# Patient Record
Sex: FEMALE | Race: WHITE | Employment: FULL TIME | URBAN - NONMETROPOLITAN AREA
[De-identification: names, ages, dates, MRNs, and addresses within clinical notes are randomized per-mention and may not be internally consistent; named-entity substitution may affect disease eponyms.]

---

## 2022-06-15 ENCOUNTER — HOSPITAL ENCOUNTER (OUTPATIENT)
Dept: PHYSICAL THERAPY | Age: 57
Setting detail: THERAPIES SERIES
Discharge: HOME | End: 2022-06-15
Payer: COMMERCIAL

## 2022-06-15 PROCEDURE — 97750 PHYSICAL PERFORMANCE TEST: CPT

## 2022-06-15 NOTE — DISCHARGE SUMMARY
** PLEASE SIGN, DATE AND TIME CERTIFICATION BELOW AND RETURN TO [facility] OUTPATIENT REHABILITATION (FAX #: [phone]). ATTEST/CO-SIGN IF ACCESSING VIA [employer]. THANK YOU.**    I certify that I have examined the patient below and determined that Physical Medicine and Rehabilitation service is necessary and that I approve the established plan of care for up to 90 days or as specifically noted. Attestation, signature or co-signature of physician indicates approval of certification requirements.    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]    Reason for Referral Application for disability benefits   Date of Evaluation 6/15/22    Additional Pertinent History Arthritis, lumbar surgery, hernia surgery       Insurance: Primary: Payor: Connie Wallis /  /  / ,   Secondary:    Authorization Information: PRE CERTIFICATION REQUIRED: NO   INSURANCE THERAPY BENEFIT:  27 VISITS MAX PER CALENDAR YEAR,  CPT CODE 41002 IS VALID   AQUATIC THERAPY COVERED: NA  MODALITIES COVERED:  NA   Visit # 1, 1/10 for progress note   Visits Allowed: 30 visits    Recertification Date: 3/74/09   Physician Orders: FCE     Total time of physical performance tests/measurements and analysis/interpretation:  Start time: 1300, End time: 1451 , Total time: 111    SUBJECTIVE: Patient is a 61year old female who presents to outpatient therapy services secondary to impaired functinoal mobility and decreased dexterity. Patient referred to physical therapy for functional capacity examination to determine current functional status with potential for disability benefits.  Patient reports that she has had chronic back pain for years now. Patient reports that she did have significant weakness in her leg. Patient reports that she had L4-S1 laminectomy and fusion surgery April 2021. Patient then went back to work in August. Patient reports by January she could not move her R leg again due to pain and weakness. Patient does report pain radiates into her feet. Patient has not had PT treatment for her back. Patient has had several injections in her back. Social/Functional History and Current Status:  Medications and Allergies have been reviewed and are listed on Medical History Questionnaire. 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     Objective:    Pain 6/10 pain in back and LEs   Sensation Light touch intact B LEs   Vision/hearing No vision or hearing impairments        NECK RANGE OF MOTION   Flexion WFL   Extension 31   Rotation Right 58   Rotation Left 35   Sidebending Right 23   Sidebending Left 25   Neck Range of Motion is [facility]  []     TRUNK RANGE OF MOTION   Flexion 80% limited   Extension 95% limited   Rotation Right 75% limited   Rotation Left 75% limited   Sidebending Right 75% limited   Sidebending Left 75% limited   Trunk Range of Motion is [facility]  []       UPPER EXTREMITY RANGE OF MOTION    Left Right COMMENTS Shoulder Flexion      Shoulder Extension      Shoulder Abduction      Shoulder Adduction      Shoulder External Rotation      Shoulder Internal Rotation      Shoulder Range of Motion is [facility]  [x]      Elbow Flexion      Elbow Extension      Elbow Range of Motion is [facility]  [x]     LOWER EXTREMITY RANGE OF MOTION    Left Right COMMENTS   Hip Flexion 90 90    Hip Extension limited limited Unable to get into prone   Hip Abduction [facility]    Hip External Rotation [facility]    Hip Internal Rotation [facility] WFL    Hip Range of Motion is [facility]  []      Knee Flexion      Knee Extension      Ankle DF      Ankle PF      Knee and Ankle Range of Motion is [facility]  [x]       UPPER EXTREMITY STRENGTH    Left Right Comments   Shoulder Flexion 5 5    Shoulder Extension 5 5    Shoulder Abduction 5 5    Shoulder Adduction 5 5    Shoulder External Rotation 5 5    Shoulder Internal Rotation 5 5    Shoulder Horizontal ABD 5 5    Shoulder Horizontal ADD 5 5    []  Shoulder Strength is grossly WFL. Elbow Flexion 5 5    Elbow Extension 5 5    Wrist Flexion 5 5    Wrist Extension 5 5    [] Elbow and Wrist Strength is grossly WFL.         LOWER EXTREMITY STRENGTH    Left Right COMMENTS   Hip Flexion 4- 3-    Hip Extension limited limited Can only perform a very small range bridge   Hip Abduction 4 4    Hip Strength is [facility]  []      Knee Flexion 5 5    Knee Extension 5 5    Ankle DF 5 5    Ankle PF 5 5    Knee and Ankle Strength is WFL  [x]      STRENGTH (TESTED WITH DYNAMOMETER)   Dynamometer Setting Left Left Hand Norms Right Right Hand Norms   1 40#  30#    2 58#, 55#, 55#  (average: 56#) 35-59# 58#, 47#, 60#   (average: 55#) 45-70#   3 45#  45#    4 42#  40#    5 40#  35#      PINCH STRENGTH (TESTED WITH PINCH METER)    Left Left Hand Norms Right Right Hand Norms   2 Point Tip 10#, 9#, 8#  (average: 9#) 9-12# 10#, 9#, 10#  (average: 9.6#) 10-13#   3 Jaw Sam 11#, 10#, 10#  (average: 10.3#) 12-18# 12#, 11#, 11#  (average: 11.3#) 13-19# Lateral/Pinch 11#, 12#, 11#  (average: 11.3#) 12.5-17# 11#, 11#, 12#  (average: 11.3#) 13-18#     [place] RATE OF MANIPULATION    Placing (seconds) Turning (seconds)   Test 1 122 seconds 112 seconds    Test 2 97 seconds 96 seconds    Total 219 seconds  208 seconds    Percentile Rank < 1st percentile  < 1st percentile      PURDUE PEGBOARD TEST    # Completed Percentile Rank   Right Hand (pegs) 11 pegs  < 1st   Left Hand (pegs) 12 pegs 3rd percentile    Both (pairs) 10 pegs 3rd percentile    Assembly (pieces) 14 pieces  < 1st percentile      MOBILITY EVALUATION    Completed Comments   Forward Walking (10 feet) Yes Patient ambulates with no AD with very stiff and rigid gait pattern, decreased marilyn, decreased step length and height, decreased trunk rotation, forward flexed trunk   Backward Walking (10 feet) Yes Patient ambulates slowly, very rigid and guarded gait pattern   Heel/Toe Walk (10 feet) Yes    Walk on Toes (10 feet) Yes    Walk on Heels (10 feet) Yes    Balance on Right Foot (5 sec) Yes No UE support   Balance on Left Foot (5 sec) Yes No UE support     NON MATERIAL HANDLING    Completed Comments   Sitting (30 minutes) No Patient only able to sit 8 minutes before having to stand up due to burning pain in her legs and back. Weight shifts often due to pain. Standing (30 minutes) No Patient able to stand for 19 minutes due to back pain and buttocks pain. Weight shifts and moves a lot due to pain.     Walking (1/2 mile) No Patient able to ambulate 276 feet in 2 minutes and 35 seconds before having to stop due to pain   Static Trunk Bending (1 minute) No  Able to hold for 13 seconds, stopped due to back and hip pain   Overhead Reaching (1 minute) No Able to hold for 40 seconds, stopped due to back pain   Repeated squatting (5 reps) Yes Able to perform squat through partial range 5 times, decreased weight shift R   Kneeling (1 minute) Yes    Stooping (5 reps) Yes Able to perform 5 through partial range and did have to bend her knees some to bend forward more    Crouching (1 minutes) No Able to hold 20 seconds, stopped due to back pain   Ladder Climb (1 flight of steps) Yes Patient negotiates 12 steps with B HR and non-reciprocal gait pattern. MATERIAL HANDLING ACTIVITIES (POUNDS)    Occasional  1-33% Frequent  34-66% Constant   %   Floor to Knuckle 12.5# 7.5# 3   12\" to Knuckle 12.5# 7.5# 3   Waist to shoulder 12.5# 7.5# 3   Shoulder to overhead 12.5# 7.5# 3   Horizontal 12.5# 7.5# 3   Push/pull sled  46# 27.6# 11.04#   Bilateral Carrying 6# 3.6# 1.44#   Carrying: Left UE 3# 1.8# 0.72#   Carrying: Right UE 3# 1.8# 0.72#     Pain at end of session: 7-8/10 pain in back and legs     Body Mechanics: Patient was willing to attempt various weights to determine the most accurate performance throughout gross motor assessment. Patient required therapist cues to improve body mechanics with lifting techniques. Patient reports increase in back and leg pain to 7-8/10 pain be the end of the assessment when she started the session with a 6/10 pain. Physical Demand Category:  Sedentary work: Exerting up to 10# force occasionally and/or negligible amount of force frequently to lift, push, carry, pull or otherwise move objects, including human body. Recommendations: The patient was assessed for physical limitations with functional activities/capacity for potentially seeking disability benefits. Based upon the strength classification as established by the Dictionary of Occupation Titles, Mrs. 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 The patient demonstrates decreased dexterity completing fine motor tasks and difficulty with gross motor activities causing increased pain. After therapist instructions on technique, she was better able to complete activities. She did not consistently show signs of self-limiting behaviors.  Signs of distress and altered mechanics were consistent with reported weight limits during material handling. Thank you for the opportunity to participate in the care of this patient. If you have any questions or concerns regarding this patient, please do not hesitate to contact the clinic.        Time In 1300   Time Out 1451   Timed Code Minutes: 111 min   Total Treatment Time: 111 min       Electronically Signed by: Gio Miller PT